# Patient Record
Sex: MALE | Race: WHITE | Employment: FULL TIME | URBAN - METROPOLITAN AREA
[De-identification: names, ages, dates, MRNs, and addresses within clinical notes are randomized per-mention and may not be internally consistent; named-entity substitution may affect disease eponyms.]

---

## 2017-10-18 ENCOUNTER — OFFICE VISIT (OUTPATIENT)
Dept: ORTHOPEDIC SURGERY | Age: 52
End: 2017-10-18

## 2017-10-18 VITALS
HEART RATE: 77 BPM | WEIGHT: 180 LBS | SYSTOLIC BLOOD PRESSURE: 154 MMHG | DIASTOLIC BLOOD PRESSURE: 92 MMHG | BODY MASS INDEX: 28.25 KG/M2 | HEIGHT: 67 IN

## 2017-10-18 DIAGNOSIS — S60.450A FOREIGN BODY OF RIGHT INDEX FINGER: ICD-10-CM

## 2017-10-18 DIAGNOSIS — S62.525A CLOSED NONDISPLACED FRACTURE OF DISTAL PHALANX OF LEFT THUMB, INITIAL ENCOUNTER: Primary | ICD-10-CM

## 2017-10-18 DIAGNOSIS — L08.9 FINGER INFECTION: ICD-10-CM

## 2017-10-18 PROCEDURE — 29130 APPL FINGER SPLINT STATIC: CPT | Performed by: ORTHOPAEDIC SURGERY

## 2017-10-18 PROCEDURE — 99204 OFFICE O/P NEW MOD 45 MIN: CPT | Performed by: ORTHOPAEDIC SURGERY

## 2017-10-18 RX ORDER — SULFAMETHOXAZOLE AND TRIMETHOPRIM 800; 160 MG/1; MG/1
1 TABLET ORAL 2 TIMES DAILY
Qty: 20 TABLET | Refills: 0 | Status: SHIPPED | OUTPATIENT
Start: 2017-10-18 | End: 2017-10-28

## 2017-10-18 RX ORDER — OXYCODONE HYDROCHLORIDE AND ACETAMINOPHEN 5; 325 MG/1; MG/1
1 TABLET ORAL EVERY 4 HOURS PRN
Qty: 30 TABLET | Refills: 0 | Status: SHIPPED | OUTPATIENT
Start: 2017-10-18 | End: 2017-10-25

## 2017-10-18 NOTE — PROGRESS NOTES
1259   Weight: 180 lb (81.6 kg)   Height: 5' 7\" (1.702 m)     Body mass index is 28.19 kg/m². Physical Exam  Constitutional:  Patient is well-nourished and demonstrates normal hygiene. Mental Status:  Patient is alert and oriented to person, place and time. Skin:  No rashes or erythema    Right Hand Examination:  Inspection:  Obvious infection on the dorsal radial aspect of the index finger. No real evidence of MP joint infection no palpable fluid collections along the flexor tendon sheath no evidence of palmar space infection or flexor tenosynovitis  Finger Range of Motion:  Finger is swollen and can only flex to about 3 cm from digital palmar crease  Wrist Range of Motion:  Normal  Vascular Exam:  Radial and ulnar arterial pulses are normal.  Mateusz's Test reveals patent palmar arch. Neurologic Exam: Numb just distal to the infection  Intrinsic Muscle Strength:  Normal  Extrinsic Muscle Strength: Normal  Special Tests:  No tenderness in the carpal tunnel mid palmar space or flexor tendon sheath    Left Hand Examination:  Inspection:  No significant swelling  Finger Range of Motion:  Full  Wrist Range of Motion:  Normal  Vascular Exam:  Radial and ulnar arterial pulses are normal.  Mateusz's Test reveals patent palmar arch. Neurologic Exam: No numbness or tingling  Intrinsic Muscle Strength:  Normal  Extrinsic Muscle Strength: Normal  Special Tests:  Tender over the distal phalanx    HEENT exam: Pupils equal round reactive to light and accommodation extraocular movements are normal.    Cardiac exam: Normal rate and rhythm without murmurs gallops or rubs. Pulmonary exam: Clear to auscultation and percussion. Lymphatic exam: No cervical or axillary or epitrochlear adenopathy. Medical history was reviewed and history and physical for surgery was performed today.       Additional Comments:     Additional Examinations:  X-Ray Findings:  PA lateral oblique x-rays of the right index finger were reviewed

## 2017-10-22 LAB
BACTERIA IDENTIFIED: ABNORMAL
BACTERIA IDENTIFIED: ABNORMAL
CLINDAMYCIN: >4
ERYTHROMYCIN: >4
GENTAMICIN: <=1
LEVOFLOXACIN: <=0.5
MICROSCOPIC OBSERVATION: ABNORMAL
OXACILLIN: >2
TETRACYCLINE: <=1
TRIMETHOPRIM: ABNORMAL
VANCOMYCIN: 1

## 2017-10-23 ENCOUNTER — OFFICE VISIT (OUTPATIENT)
Dept: ORTHOPEDIC SURGERY | Age: 52
End: 2017-10-23

## 2017-10-23 DIAGNOSIS — L08.9 FINGER INFECTION: Primary | ICD-10-CM

## 2017-10-23 PROCEDURE — 99024 POSTOP FOLLOW-UP VISIT: CPT | Performed by: ORTHOPAEDIC SURGERY

## 2017-10-23 RX ORDER — OXYCODONE HYDROCHLORIDE AND ACETAMINOPHEN 5; 325 MG/1; MG/1
1 TABLET ORAL EVERY 6 HOURS PRN
Qty: 30 TABLET | Refills: 0 | Status: SHIPPED | OUTPATIENT
Start: 2017-10-23 | End: 2017-10-30

## 2017-10-23 RX ORDER — SULFAMETHOXAZOLE AND TRIMETHOPRIM 800; 160 MG/1; MG/1
1 TABLET ORAL 2 TIMES DAILY
Qty: 20 TABLET | Refills: 0 | Status: SHIPPED | OUTPATIENT
Start: 2017-10-23 | End: 2017-10-27

## 2017-10-23 NOTE — PROGRESS NOTES
Assessment: Tremendous improvement in methicillin-resistant staph severe infection of the right hand with removal of 2 metallic foreign bodies. Treatment Plan: His wound is just loosely closed with one suture which we will remove next week. I instructed him on the importance of starting early range of motion doing soaks and dressing changes. I have also given him another 10 day prescription for Bactrim as he has methicillin-resistant staph    Return in about 1 week (around 10/30/2017). History of Present Illness  Corby Beck is a 47 y.o. male. 1st postop visit    Review of Systems  Complete Review of Systems performed and is non-contributory except for what is noted in HPI. Vital Signs  There were no vitals filed for this visit. There is no height or weight on file to calculate BMI. Physical Exam  Constitutional:  Patient is well-nourished and demonstrates normal hygiene. Mental Status:  Patient is alert and oriented to person, place and time. Skin:  Intact, no rashes or lesions.     Hand Examination: Substantial improvement in the hand    Additional Comments:     Additional Examinations:  X-Ray Findings:  PA lateral and oblique x-rays of the right index finger show that the foreign bodies a been completely removed  Additional Diagnostic Test Findings:    Office Procedures:    Orders Placed This Encounter   Procedures    XR FINGER RIGHT (MIN 2 VIEWS)     16432     Order Specific Question:   Reason for exam:     Answer:   Pain

## 2017-10-23 NOTE — ADDENDUM NOTE
Encounter addended by: Hunter Reed.  Roma Vega on: 10/23/2017 10:31 AM<BR>    Actions taken: Letter status changed

## 2017-10-30 ENCOUNTER — OFFICE VISIT (OUTPATIENT)
Dept: ORTHOPEDIC SURGERY | Age: 52
End: 2017-10-30

## 2017-10-30 VITALS — BODY MASS INDEX: 28.24 KG/M2 | WEIGHT: 179.9 LBS | HEIGHT: 67 IN

## 2017-10-30 DIAGNOSIS — L08.9 FINGER INFECTION: Primary | ICD-10-CM

## 2017-10-30 PROCEDURE — 99213 OFFICE O/P EST LOW 20 MIN: CPT | Performed by: ORTHOPAEDIC SURGERY

## 2017-10-30 NOTE — PROGRESS NOTES
Assessment: Tremendous improvement and right hand infection due to methicillin-resistant staph. Patient was in the emergency room with a fever and headache this weekend. There is no evidence of ongoing infection in his hand. Treatment Plan: I instructed him on home range of motion exercises. I think he is moving well enough that he can do this without therapy considering he still has an open wound that had previous methicillin-resistant staph. I therefore do not want to necessarily contaminate the therapy department unless necessary    Return in about 3 weeks (around 11/20/2017). History of Present Illness  Jennifer Wills is a 47 y.o. male. Patient called on Friday with a fever and headache. He went to the emergency room they felt that he was dehydrated and was actually having urinary symptoms. His hand is substantially better with minimal to no drainage    Review of Systems  Complete Review of Systems performed and is non-contributory except for what is noted in HPI. Vital Signs  Vitals:    10/30/17 0941   Weight: 179 lb 14.3 oz (81.6 kg)   Height: 5' 7.01\" (1.702 m)     Body mass index is 28.17 kg/m². Physical Exam  Constitutional:  Patient is well-nourished and demonstrates normal hygiene. Mental Status:  Patient is alert and oriented to person, place and time. Skin:  Intact, no rashes or lesions. Hand Examination: The proximal portion of his wound is completely healed. Distally we still have about a 1 cm x 3 mm open area. No exudate or discharge. All of the surrounding erythema has completely resolved and there is no evidence of more proximal infection    Additional Comments:     Additional Examinations:  X-Ray Findings:    Additional Diagnostic Test Findings:    Office Procedures:    No orders of the defined types were placed in this encounter.

## 2017-11-13 ENCOUNTER — TELEPHONE (OUTPATIENT)
Dept: ORTHOPEDIC SURGERY | Age: 52
End: 2017-11-13

## 2017-11-20 ENCOUNTER — OFFICE VISIT (OUTPATIENT)
Dept: ORTHOPEDIC SURGERY | Age: 52
End: 2017-11-20

## 2017-11-20 DIAGNOSIS — L08.9 FINGER INFECTION: Primary | ICD-10-CM

## 2017-11-20 PROCEDURE — 99213 OFFICE O/P EST LOW 20 MIN: CPT | Performed by: ORTHOPAEDIC SURGERY

## 2017-11-27 ENCOUNTER — HOSPITAL ENCOUNTER (OUTPATIENT)
Dept: OCCUPATIONAL THERAPY | Age: 52
Discharge: HOME | End: 2017-11-30
Admitting: ORTHOPAEDIC SURGERY

## 2017-12-01 ENCOUNTER — HOSPITAL ENCOUNTER (OUTPATIENT)
Dept: PHYSICAL THERAPY | Age: 52
Discharge: HOME | End: 2017-12-31
Attending: ORTHOPAEDIC SURGERY | Admitting: ORTHOPAEDIC SURGERY

## 2017-12-20 ENCOUNTER — OFFICE VISIT (OUTPATIENT)
Dept: ORTHOPEDIC SURGERY | Age: 52
End: 2017-12-20

## 2017-12-20 VITALS — WEIGHT: 190 LBS | BODY MASS INDEX: 30.53 KG/M2 | HEIGHT: 66 IN

## 2017-12-20 DIAGNOSIS — L08.9 FINGER INFECTION: Primary | ICD-10-CM

## 2017-12-20 PROCEDURE — 99213 OFFICE O/P EST LOW 20 MIN: CPT | Performed by: ORTHOPAEDIC SURGERY

## 2017-12-20 NOTE — PROGRESS NOTES
Assessment: Substantial improvement after methicillin-resistant staph infection dorsal radial aspect of the right index finger proximal phalanx. At surgery we had to do extensive subcutaneous debridement. Treatment Plan: He still has some numbness in the dorsal cutaneous nerve distribution over the back of this finger had told him this is just going to take a lot of time for nerve recruitment to resolve. He's not been doing therapy because he is back to work and I have really recommended that he continue to do his passive extension exercises as well as some scar massage exercises to remobilize the subcutaneous tissues and the extensor tendon mechanism. He is currently back to regular duty at work    Return if symptoms worsen or fail to improve. History of Present Illness  Shonda Giraldo is a 47 y.o. male. Mishel Pedraza is now 2 months post extensive subcutaneous debridement for methicillin resistant staph infection dorsal aspect of the right index finger    Review of Systems  Complete Review of Systems performed and is non-contributory except for what is noted in HPI. Vital Signs  Vitals:    12/20/17 1511   Weight: 190 lb (86.2 kg)   Height: 5' 6\" (1.676 m)     Body mass index is 30.67 kg/m². Physical Exam  Constitutional:  Patient is well-nourished and demonstrates normal hygiene. Mental Status:  Patient is alert and oriented to person, place and time. Skin:  Intact, no rashes or lesions. Hand Examination: Wound is now fully healed. He has some significant scar tissue adhesions between the skin and the subcutaneous tissue. He has almost full flexion of the index finger just a little tight but he can touch his distal palmar crease. He does lack about 5° of full PIP extension. Vascular exam shows normal capillary refill.     Neurologic exam sensation is intact in radial and ulnar digital nerve distribution to the index finger but he is numb dorsally over the middle phalanx consistent with

## 2018-10-05 ENCOUNTER — HOSPITAL ENCOUNTER (EMERGENCY)
Age: 53
Discharge: HOME | End: 2018-10-05
Payer: COMMERCIAL

## 2018-10-05 VITALS
RESPIRATION RATE: 15 BRPM | OXYGEN SATURATION: 99 % | SYSTOLIC BLOOD PRESSURE: 148 MMHG | HEART RATE: 65 BPM | HEIGHT: 66 IN | BODY MASS INDEX: 28.93 KG/M2 | DIASTOLIC BLOOD PRESSURE: 83 MMHG | TEMPERATURE: 97.2 F | WEIGHT: 180 LBS

## 2018-10-05 DIAGNOSIS — H10.33 ACUTE CONJUNCTIVITIS OF BOTH EYES, UNSPECIFIED ACUTE CONJUNCTIVITIS TYPE: Primary | ICD-10-CM

## 2018-10-05 PROCEDURE — 6370000000 HC RX 637 (ALT 250 FOR IP): Performed by: NURSE PRACTITIONER

## 2018-10-05 PROCEDURE — 99282 EMERGENCY DEPT VISIT SF MDM: CPT

## 2018-10-05 RX ORDER — ERYTHROMYCIN 5 MG/G
OINTMENT OPHTHALMIC
Qty: 1 TUBE | Refills: 0 | Status: SHIPPED | OUTPATIENT
Start: 2018-10-05 | End: 2018-10-15

## 2018-10-05 RX ORDER — ERYTHROMYCIN 5 MG/G
OINTMENT OPHTHALMIC ONCE
Status: COMPLETED | OUTPATIENT
Start: 2018-10-05 | End: 2018-10-05

## 2018-10-05 RX ADMIN — ERYTHROMYCIN: 5 OINTMENT OPHTHALMIC at 21:54

## 2018-10-05 ASSESSMENT — PAIN DESCRIPTION - LOCATION: LOCATION: EYE

## 2018-10-05 ASSESSMENT — PAIN DESCRIPTION - DESCRIPTORS: DESCRIPTORS: BURNING

## 2018-10-05 ASSESSMENT — PAIN SCALES - GENERAL: PAINLEVEL_OUTOF10: 7

## 2018-10-06 NOTE — ED PROVIDER NOTES
CHIEF COMPLAINT  Chief Complaint   Patient presents with    Conjunctivitis     patient states that he thinks that he may have gotten the powder from concrete in his eyes thursday. pt. states that he woke up this AM with painful, red, irritated eyes. HISTORY OF PRESENT ILLNESS  Basilia Qiu is a 47 y.o. male presents to the emergency room by Private vehicle accompanied by his wife for evaluation of conjunctivitis bilateral eyes began this morning. The patient [place] dust in his eyes yesterday flush his eyes and was without foreign body sensation. This morning he woke up with red, irritated, and a full eyes. He states it's a continuous moderate to severe burning sensation that has not improved with irrigation. Has mild to moderate photophobia, increased tearing, and discharge. Denies vision changes, syncope, additional injuries, headache, and foreign body sensation. Pain Assessment  Pain Level: 7  Pain Location: Eye  Pain Descriptors: Burning]     No other complaints, modifying factors or associated symptoms. Nursing notes reviewed. Past Medical History:   Diagnosis Date    Neisseria gonorrheae 05/25/2018     Past Surgical History:   Procedure Laterality Date    HAND SURGERY       History reviewed. No pertinent family history. Social History     Social History    Marital status:      Spouse name: N/A    Number of children: N/A    Years of education: N/A     Occupational History    Not on file.      Social History Main Topics    Smoking status: Never Smoker    Smokeless tobacco: Never Used    Alcohol use No    Drug use: No    Sexual activity: Not on file     Other Topics Concern    Not on file     Social History Narrative    No narrative on file     Current Facility-Administered Medications   Medication Dose Route Frequency Provider Last Rate Last Dose    fluorescein ophthalmic strip 1 mg  1 strip Ophthalmic Once Ramesh oCmer, APRN - CNP         Current Coordination normal.   Skin: Skin is warm, dry and intact. No rash noted. He is not diaphoretic. Psychiatric: He has a normal mood and affect. His speech is normal and behavior is normal. Judgment and thought content normal. Cognition and memory are normal.   Nursing note and vitals reviewed. Labs:    Labs Reviewed - No data to display    All other labs were within normal range or not returned as of this dictation. EKG: All EKG's are interpreted by the Emergency Department Physician who either signs or Co-signs this chart in the absence of a cardiologist.  Please see their note for interpretation of EKG. RADIOLOGY:   Non-plain film images such as CT, Ultrasound and MRI are read by the radiologist. Plain radiographic images are visualized and preliminarily interpreted by the  ED Provider with the below findings:        Interpretation per the Radiologist below, if available at the time of this note:    No orders to display     No results found. ED COURSE/MDM    Patient was given the following medications:  Medications   fluorescein ophthalmic strip 1 mg (not administered)   erythromycin [facility]) ophthalmic ointment ( Both Eyes Given 10/5/18 2154)            Patient Evaluated for conjunctivitis. Exam without visualize corneal abrasion, flare, or ulcer. No foreign bodies were seen. Bilateral eyes were irrigated, fluorescein, and first dose of Romycin applied. Improvement in pain with tetracaine. Discuss anti-inflammatories for pain control. Stressed the importance of follow up. [facility] referral given. Afebrile, non toxic in appearance, in no acute distress, pulse ox is 99% on room air and is not hypoxic. Will be discharged to home in stable condition with outpatient follow up. Instructed patient and/or family to return to the emergency room for new or worsening symptoms and any concerns. Instructed to call referrals below to discuss ER visit and follow-up plan.     I estimate there is LOW

## 2024-10-01 ENCOUNTER — HOSPITAL ENCOUNTER (EMERGENCY)
Age: 59
Discharge: HOME | End: 2024-10-01
Payer: OTHER MISCELLANEOUS

## 2024-10-01 ENCOUNTER — APPOINTMENT (OUTPATIENT)
Dept: GENERAL RADIOLOGY | Age: 59
End: 2024-10-01
Payer: OTHER MISCELLANEOUS

## 2024-10-01 VITALS
TEMPERATURE: 98.2 F | DIASTOLIC BLOOD PRESSURE: 94 MMHG | BODY MASS INDEX: 28.93 KG/M2 | SYSTOLIC BLOOD PRESSURE: 177 MMHG | OXYGEN SATURATION: 98 % | RESPIRATION RATE: 18 BRPM | WEIGHT: 180 LBS | HEIGHT: 66 IN | HEART RATE: 67 BPM

## 2024-10-01 DIAGNOSIS — S16.1XXA STRAIN OF NECK MUSCLE, INITIAL ENCOUNTER: ICD-10-CM
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
## 2024-10-01 PROCEDURE — 72040 X-RAY EXAM NECK SPINE 2-3 VW: CPT

## 2024-10-01 PROCEDURE — 99283 EMERGENCY DEPT VISIT LOW MDM: CPT

## 2024-10-01 PROCEDURE — 6370000000 HC RX 637 (ALT 250 FOR IP): Performed by: PHYSICIAN ASSISTANT

## 2024-10-01 RX ORDER — METHOCARBAMOL 500 MG/1
750 TABLET, FILM COATED ORAL ONCE
Status: COMPLETED | OUTPATIENT
Start: 2024-10-01 | End: 2024-10-01

## 2024-10-01 RX ORDER — METHOCARBAMOL 750 MG/1
750 TABLET, FILM COATED ORAL 4 TIMES DAILY
Qty: 40 TABLET | Refills: 0 | Status: SHIPPED | OUTPATIENT
Start: 2024-10-01 | End: 2024-10-11

## 2024-10-01 RX ORDER — NAPROXEN 500 MG/1
500 TABLET ORAL 2 TIMES DAILY WITH MEALS
Qty: 60 TABLET | Refills: 5 | Status: SHIPPED | OUTPATIENT
Start: 2024-10-01

## 2024-10-01 RX ORDER — ACETAMINOPHEN 500 MG
500 TABLET ORAL ONCE
Status: COMPLETED | OUTPATIENT
Start: 2024-10-01 | End: 2024-10-01

## 2024-10-01 RX ADMIN — METHOCARBAMOL TABLETS 750 MG: 500 TABLET, COATED ORAL at 18:18

## 2024-10-01 RX ADMIN — ACETAMINOPHEN 500 MG: 500 TABLET ORAL at 18:18

## 2024-10-01 ASSESSMENT — PAIN DESCRIPTION - LOCATION: LOCATION: NECK

## 2024-10-01 ASSESSMENT — PAIN DESCRIPTION - DESCRIPTORS: DESCRIPTORS: ACHING

## 2024-10-01 ASSESSMENT — LIFESTYLE VARIABLES
HOW OFTEN DO YOU HAVE A DRINK CONTAINING ALCOHOL: NEVER
HOW MANY STANDARD DRINKS CONTAINING ALCOHOL DO YOU HAVE ON A TYPICAL DAY: PATIENT DOES NOT DRINK

## 2024-10-01 ASSESSMENT — PAIN - FUNCTIONAL ASSESSMENT: PAIN_FUNCTIONAL_ASSESSMENT: 0-10

## 2024-10-01 ASSESSMENT — PAIN SCALES - GENERAL: PAINLEVEL_OUTOF10: 7

## 2024-10-01 ASSESSMENT — PAIN DESCRIPTION - ORIENTATION: ORIENTATION: POSTERIOR
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
